# Patient Record
Sex: MALE | Race: WHITE | Employment: OTHER | URBAN - METROPOLITAN AREA
[De-identification: names, ages, dates, MRNs, and addresses within clinical notes are randomized per-mention and may not be internally consistent; named-entity substitution may affect disease eponyms.]

---

## 2022-11-01 ENCOUNTER — APPOINTMENT (OUTPATIENT)
Dept: CT IMAGING | Age: 82
End: 2022-11-01
Payer: MEDICARE

## 2022-11-01 ENCOUNTER — HOSPITAL ENCOUNTER (EMERGENCY)
Age: 82
Discharge: NURSING, REHAB OR LONG-TERM CARE | End: 2022-11-01
Attending: EMERGENCY MEDICINE
Payer: MEDICARE

## 2022-11-01 VITALS
RESPIRATION RATE: 16 BRPM | TEMPERATURE: 97 F | BODY MASS INDEX: 21 KG/M2 | SYSTOLIC BLOOD PRESSURE: 160 MMHG | WEIGHT: 150 LBS | DIASTOLIC BLOOD PRESSURE: 90 MMHG | HEART RATE: 62 BPM | OXYGEN SATURATION: 98 % | HEIGHT: 71 IN

## 2022-11-01 DIAGNOSIS — S01.81XA FACIAL LACERATION, INITIAL ENCOUNTER: ICD-10-CM

## 2022-11-01 DIAGNOSIS — S09.90XA INJURY OF HEAD, INITIAL ENCOUNTER: Primary | ICD-10-CM

## 2022-11-01 PROCEDURE — 12011 RPR F/E/E/N/L/M 2.5 CM/<: CPT

## 2022-11-01 PROCEDURE — 70450 CT HEAD/BRAIN W/O DYE: CPT

## 2022-11-01 PROCEDURE — 99284 EMERGENCY DEPT VISIT MOD MDM: CPT

## 2022-11-01 SDOH — ECONOMIC STABILITY: FOOD INSECURITY: WITHIN THE PAST 12 MONTHS, THE FOOD YOU BOUGHT JUST DIDN'T LAST AND YOU DIDN'T HAVE MONEY TO GET MORE.: NEVER TRUE

## 2022-11-01 ASSESSMENT — ENCOUNTER SYMPTOMS
SHORTNESS OF BREATH: 0
VOMITING: 0
NAUSEA: 0

## 2022-11-01 ASSESSMENT — PAIN DESCRIPTION - LOCATION: LOCATION: NOSE

## 2022-11-01 ASSESSMENT — PAIN - FUNCTIONAL ASSESSMENT: PAIN_FUNCTIONAL_ASSESSMENT: 0-10

## 2022-11-01 ASSESSMENT — PAIN SCALES - GENERAL: PAINLEVEL_OUTOF10: 2

## 2022-11-16 ENCOUNTER — APPOINTMENT (OUTPATIENT)
Dept: CT IMAGING | Age: 82
End: 2022-11-16
Payer: MEDICARE

## 2022-11-16 ENCOUNTER — APPOINTMENT (OUTPATIENT)
Dept: GENERAL RADIOLOGY | Age: 82
End: 2022-11-16
Payer: MEDICARE

## 2022-11-16 ENCOUNTER — HOSPITAL ENCOUNTER (EMERGENCY)
Age: 82
Discharge: HOME | End: 2022-11-16
Attending: EMERGENCY MEDICINE
Payer: MEDICARE

## 2022-11-16 VITALS
HEART RATE: 73 BPM | RESPIRATION RATE: 18 BRPM | TEMPERATURE: 98.8 F | DIASTOLIC BLOOD PRESSURE: 80 MMHG | OXYGEN SATURATION: 96 % | SYSTOLIC BLOOD PRESSURE: 165 MMHG

## 2022-11-16 DIAGNOSIS — F03.90 DEMENTIA, UNSPECIFIED DEMENTIA SEVERITY, UNSPECIFIED DEMENTIA TYPE, UNSPECIFIED WHETHER BEHAVIORAL, PSYCHOTIC, OR MOOD DISTURBANCE OR ANXIETY (HCC): ICD-10-CM

## 2022-11-16 DIAGNOSIS — S80.02XA CONTUSION OF LEFT KNEE, INITIAL ENCOUNTER: ICD-10-CM

## 2022-11-16 DIAGNOSIS — S09.90XA CLOSED HEAD INJURY, INITIAL ENCOUNTER: Primary | ICD-10-CM

## 2022-11-16 PROCEDURE — 72125 CT NECK SPINE W/O DYE: CPT

## 2022-11-16 PROCEDURE — 70450 CT HEAD/BRAIN W/O DYE: CPT

## 2022-11-16 PROCEDURE — 99284 EMERGENCY DEPT VISIT MOD MDM: CPT

## 2022-11-16 PROCEDURE — 12001 RPR S/N/AX/GEN/TRNK 2.5CM/<: CPT

## 2022-11-16 PROCEDURE — 73562 X-RAY EXAM OF KNEE 3: CPT

## 2022-11-16 ASSESSMENT — PAIN - FUNCTIONAL ASSESSMENT: PAIN_FUNCTIONAL_ASSESSMENT: NONE - DENIES PAIN

## 2022-11-16 NOTE — ED NOTES
Pt to ER per EMS, transferred to bed, full assist. Pt with dementia, from [facility], report from medics received, that patient rolled out of bed about two hours PTA, resulting in abrasion to left knee and approx 1 inch lac to forehead, bleeding controlled. Per nurses at the facility, many falls over the past 2 weeks. Pt denies pain.  Pt pleasantly confused, approp conversation, follows commands, respers even non labored, skin warm pink, no immediate distress, here for eval.      Judie Simmonsor, RN  11/16/22 9884

## 2022-11-16 NOTE — DISCHARGE INSTRUCTIONS
Take your medication as indicated and prescribed. For pain use acetaminophen (Tylenol). You can take over the counter acetaminophen tablets (1 - 2 tablets of the 500-mg strength every 6 hours). Do not take any medication that contains aspirin / ibuprofen or blood thinning properties until seen by your physician. PLEASE RETURN TO THE EMERGENCY DEPARTMENT IMMEDIATELY for worsening symptoms, persistent headache, change in vision / hearing / taste, ringing in your ears, sleeping more than normal, or if you develop any concerning symptoms such as: high fever not relieved by acetaminophen (Tylenol) and/or ibuprofen (Motrin / Advil), chills, shortness of breath, chest pain, feeling of your heart fluttering or racing, persistent nausea and/or vomiting, vomiting up blood, blood in your stool, loss of consciousness, numbness, weakness or tingling in the arms or legs or change in color of the extremities, changes in mental status, blurry vision, loss of bladder / bowel control, unable to follow up with your physician, or other any other care or concern.

## 2022-11-16 NOTE — ED NOTES
Pt in cont to urine, maritza care done, clean depends placed, linens changed.       Nafisa President, RN  11/16/22 5887

## 2022-11-16 NOTE — ED PROVIDER NOTES
[address] [facility] Emergency Department  [address] [address] [facility] [address] [facility] [address]  Phone: [phone]  Fax: [phone]      Pt Name: Sarah Renteria  Lists of hospitals in the [place]:[id number]  [place] 1940  Date of evaluation: 11/16/2022      CHIEF COMPLAINT       Chief Complaint   Patient presents with    Fall    Head Laceration       HISTORY OF PRESENT ILLNESS   Sarah Renteria is a 79 y.o. male who presents for evaluation of head laceration after a fall. The patient has history of dementia and is a poor historian. He lives in the memory unit at [facility]. He has had frequent falls over the past several weeks. He arrives by EMS. They report that the patient fell approximately 2 hours prior to arrival.  He was found next to his bed after he had rolled out of bed. The staff had already moved furniture away from his bed so he did not strike anything on the way down. The patient denies any pain. He has a 2 cm laceration to his forehead and bleeding is controlled. He does not take any anticoagulants. The fall was unwitnessed and they are unsure if he lost consciousness. The patient also has an abrasion over his right knee. He denies any pain to his head or his knee. The patient is a DNR CCA. His tetanus is reportedly up-to-date. The patient denies fever, chills, headache, vision changes, neck pain, back pain, chest pain, shortness of breath, abdominal pain, urinary/bowel symptoms, focal weakness, numbness, tingling, recent illness.     REVIEW OF SYSTEMS     Ten point review of systems was reviewed and is negative unless otherwise noted in the HPI    [address]    has a past medical history of Atherosclerotic heart disease, Benign prostatic hyperplasia, Cellulitis of left lower limb, Essential (primary) hypertension, Hyperlipidemia, Major depressive disorder, Periodic limb movement disorder, Restless leg syndrome, Thrombocytopenia ([facility] [address] and Unspecified dementia, mild, without behavioral disturbance, psychotic disturbance, mood disturbance, and anxiety. SURGICAL HISTORY      has no past surgical history on file. Craniotomy    CURRENT MEDICATIONS       There are no discharge medications for this patient. ALLERGIES     has No Known Allergies. FAMILY HISTORY     has no family status information on file. family history is not on file. SOCIAL HISTORY      reports that he has never smoked. He has never used smokeless tobacco. He reports that he does not drink alcohol and does not use drugs. PHYSICAL EXAM     INITIAL VITALS:  oral temperature is 98.8 °F (37.1 °C). His blood pressure is 165/80 (abnormal) and his pulse is 73. His respiration is 18 and oxygen saturation is 96%. CONSTITUTIONAL: no apparent distress, well appearing  SKIN: Bruises in multiple stages of healing. There is a acute 1 cm shallow laceration to the mid forehead without any active bleeding. No violation of the galea. No foreign body. Skin otherwise warm, dry, no jaundice, hives or petechiae  EYES: clear conjunctiva, non-icteric sclera, pupils 3 mm, equal, round reactive to light. Extraocular movements intact. HENT: normocephalic, moist mucus membranes  NECK: Nontender and supple with no nuchal rigidity, full range of motion  PULMONARY: clear to auscultation without wheezes, rhonchi, or rales, normal excursion, no accessory muscle use and no stridor  CARDIOVASCULAR: regular rate, rhythm. Strong radial pulses with intact distal perfusion. Capillary refill <2 seconds. GASTROINTESTINAL: soft, non-tender, non-distended, no palpable masses, no rebound or guarding   GENITOURINARY: No costovertebral angle tenderness to palpation  MUSCULOSKELETAL: No midline spinal tenderness, step off or deformity. Extremities are otherwise nontender to palpation and nonerythematous. Compartments soft. No peripheral edema. NEUROLOGIC: alert and oriented x self, GCS 15, baseline mentation and normal speech.  Moves all extremities x 4 without motor or sensory deficit. Cranial nerves II through XII intact. No cerebellar signs. No pronator drift. Normal finger-nose. PSYCHIATRIC: Pleasantly demented    DIAGNOSTIC RESULTS     EKG:  None    RADIOLOGY:   XR KNEE LEFT (3 VIEWS)    Result Date: 11/16/2022  EXAMINATION: THREE X-RAY VIEWS OF THE LEFT KNEE 11/16/2022 4:35 am COMPARISON: None. HISTORY: ORDERING SYSTEM PROVIDED HISTORY: fall out of bed, abrasion to left knee TECHNOLOGIST PROVIDED HISTORY: Fall out of bed, abrasion to left knee Reason for Exam: fall, left knee pain FINDINGS: Diffuse bone demineralization. Chronic appearing left knee arthroplasty and patellar resurfacing. Orthopedic hardware appears intact. No evidence for hardware complication is seen. No fractures or dislocations. No suspicious focal bony lesions. No bony erosions or bony destructive changes. No knee joint effusion. Mild anterior soft tissue swelling. No radiopaque foreign bodies or soft tissue gas. Atherosclerotic vascular calcifications. Mild anterior soft tissue swelling. No acute bony abnormality. Chronic left knee arthroplasty without evidence for hardware complication. Diffuse bone demineralization. CT Head W/O Contrast    Result Date: 11/16/2022  EXAMINATION: CT OF THE HEAD WITHOUT CONTRAST  11/16/2022 3:10 am TECHNIQUE: CT of the head was performed without the administration of intravenous contrast. Automated exposure control, iterative reconstruction, and/or weight based adjustment of the mA/kV was utilized to reduce the radiation dose to as low as reasonably achievable. COMPARISON: CT scan of brain on 11/01/2022.  HISTORY: ORDERING SYSTEM PROVIDED HISTORY: frequent falls, struck head on floor after rolling out of bed, h/o dementia, forehead laceration TECHNOLOGIST PROVIDED HISTORY: frequent falls, struck head on floor after rolling out of bed, h/o dementia, forehead laceration Decision Support Exception - unselect if not a suspected or confirmed emergency medical condition->Emergency Medical Condition (MA) Reason for Exam: fall, frontal head injury Relevant Medical/Surgical History: dementia FINDINGS: BRAIN/VENTRICLES: Evidence of previous bilateral craniotomies, right more extensive than left. Mild motion induced  artifacts on some of the images. No definable intracranial hemorrhage. Evidence of mild to moderate cortical atrophy changes. Evidence of moderate central atrophy changes with moderate to markedly prominent cerebral lateral ventricles, similar to previous studies. In the periventricular and central white matter, there are moderate chronic microvascular ischemic/aging changes. Evidence of old lacunar infarction in the left subinsular area. No focal abnormality in bilateral basal ganglia structures, bilateral thalami, brainstem and cerebellum. No evidence of intracranial mass, subdural or epidural hematoma. ORBITS: The visualized portion of the orbits demonstrate no acute abnormality. SINUSES: The visualized paranasal sinuses and mastoid air cells demonstrate no acute abnormality. No evidence of intracranial hemorrhage or any other definable acute intracranial abnormality. There are chronic changes with mild cerebral cortical atrophy and moderate central atrophy and moderate chronic microvascular ischemic/aging changes in the white matter of cerebrum. Evidence of previous bilateral craniotomies redemonstrated. CT CSpine W/O Contrast    Result Date: 11/16/2022  EXAMINATION: CT OF THE CERVICAL SPINE WITHOUT CONTRAST 11/16/2022 3:11 am TECHNIQUE: CT of the cervical spine was performed without the administration of intravenous contrast. Multiplanar reformatted images are provided for review. Automated exposure control, iterative reconstruction, and/or weight based adjustment of the mA/kV was utilized to reduce the radiation dose to as low as reasonably achievable. COMPARISON: None.  HISTORY: ORDERING SYSTEM PROVIDED HISTORY: fall out of bed, struck head, dementia TECHNOLOGIST PROVIDED HISTORY: Fall out of bed, struck head, dementia Decision Support Exception - unselect if not a suspected or confirmed emergency medical condition->Emergency Medical Condition (MA) Reason for Exam: fall out of bed, struck head, dementia FINDINGS: BONES/ALIGNMENT: There is no definable acute fracture or dislocation in the cervical spine. Evidence of previous anterior spinal body fusion from C3 level through C5 level. Evidence of mild-to-moderate loss of heights of C6 and C7 vertebral bodies, likely to be chronic due to osteoporosis. There is no definable fracture line in these vertebral bodies. The odontoid process, the atlantodental joint and the craniovertebral junction are intact. Cervical facets are normally aligned bilaterally without identifiable fracture. At C1 level, there is no evidence of fracture. At C2-C3 level, minimal degenerative disc disease and moderate left facet osteoarthritis. No significant central stenosis. Moderate stenosis of left neural foramen. No stenosis of the right neural foramen. Regions of anterior spinal body fusion at C3-C4 and C4-C5 levels. Moderate to marked facet osteoarthritis bilaterally. No significant central stenosis but evidence of severe neural foraminal stenosis at these levels, bilaterally. At C5-C6 level, mild to moderate degenerative disc disease and marked hypertrophic facet osteoarthritis bilaterally. No significant central stenosis. Severe neural foraminal stenosis bilaterally. At C6-C7 level, minimal degenerative disc disease and mild-to-moderate facet osteoarthritis, without central stenosis but with moderate neural foraminal stenosis bilaterally. At C7-T1 level, mild DDD changes with moderate facet osteoarthritis bilaterally. At T1-T2 level, minimal DDD changes without stenosis. SOFT TISSUES: There is no prevertebral soft tissue swelling. No definable acute process in the apices of the lungs.      No definable acute fracture or dislocation in the cervical spine. Mild-to-moderate loss of heights of C6 and C7 vertebral bodies without definable fracture line, most likely due to old compression secondary to process of osteopenia/osteoporosis. Evidence of previous anterior spinal fusion from C3 level through C5 level without central stenosis but with severe neural foraminal stenosis bilaterally at C3-C4 and C4-C5 levels. In the rest of cervical spine there are also findings of moderate to severe facet osteoarthritis bilaterally with multilevel moderate to severe neural foraminal stenosis. LABS:  No results found for this visit on 11/16/22. EMERGENCY DEPARTMENT COURSE:        The patient was given the following medications:  No orders of the defined types were placed in this encounter.        Vitals:    Vitals:    11/16/22 0337 11/16/22 0352 11/16/22 0437 11/16/22 0452   BP:       Pulse:       Resp:       Temp:       TempSrc:       SpO2: 99% 99% 96% 96%     -------------------------  BP: (!) 165/80, Temp: 98.8 °F (37.1 °C), Heart Rate: 73, Resp: 18    CONSULTS:  None    CRITICAL CARE:   None    PROCEDURES:  Lac Repair    Date/Time: 11/16/2022 6:36 AM  Performed by: Nazario Childress DO  Authorized by: Nazario Childress DO     Consent:     Consent obtained:  Verbal    Consent given by:  Patient    Risks, benefits, and alternatives were discussed: yes      Risks discussed:  Infection, pain, tendon damage, poor cosmetic result, need for additional repair, nerve damage, poor wound healing, vascular damage and retained foreign body    Alternatives discussed:  No treatment, delayed treatment, observation and referral  Universal protocol:     Procedure explained and questions answered to patient or proxy's satisfaction: yes      Imaging studies available: yes      Immediately prior to procedure, a time out was called: yes      Patient identity confirmed:  Arm band  Anesthesia:     Anesthesia method:  None  Laceration details: Location:  Scalp    Scalp location:  Frontal    Length (cm):  2    Depth (mm):  1  Pre-procedure details:     Preparation:  Patient was prepped and draped in usual sterile fashion and imaging obtained to evaluate for foreign bodies  Exploration:     Limited defect created (wound extended): no      Hemostasis achieved with:  Direct pressure    Imaging outcome: foreign body not noted      Wound exploration: wound explored through full range of motion and entire depth of wound visualized      Wound extent: no fascia violation noted, no foreign bodies/material noted, no muscle damage noted, no nerve damage noted, no tendon damage noted, no underlying fracture noted and no vascular damage noted      Contaminated: no    Treatment:     Area cleansed with:  Chlorhexidine    Amount of cleaning:  Standard    Irrigation solution:  Sterile saline    Irrigation volume:  500cc    Irrigation method:  Pressure wash    Visualized foreign bodies/material removed: no      Debridement:  None    Undermining:  None    Scar revision: no    Skin repair:     Repair method:  Tissue adhesive  Approximation:     Approximation:  Close  Repair type:     Repair type:  Simple  Post-procedure details:     Dressing:  Open (no dressing)    Procedure completion:  Tolerated well, no immediate complications        DIAGNOSIS/ MDM:   Mirian Adorno is a 79 y.o. male who presents with a fall. Vital signs are stable. He did strike his head and has a small laceration that was closed with Dermabond. No signs of basilar skull fracture. CT head, CT cervical spine, and x-ray of the left knee show no acute process. I have low suspicion for fracture, dislocation, intracranial hemorrhage, or compartment syndrome. The patient denies any pain. He will be discharged back to his memory care unit. I recommended that he take Tylenol as needed for pain.   He was instructed to follow-up with his PCP within 1 to 2 days and to return to the ER for worsening symptoms or any other concern. The patient understands that at this time there is no evidence for a more malignant underlying process, but also understands that early in the process of an illness or injury, an emergency department work-up can be falsely reassuring. Routine discharge counseling was given, and the patient understands that worsening, changing or persistent symptoms should prompt a immediate call or follow-up with their primary care physician or return to the emergency department. The importance of appropriate follow-up was also discussed. I have reviewed the disposition diagnosis with the patient. I have answered their questions and given discharge instructions. They voiced understanding of these instructions and did not have any further questions or complaints. FINAL IMPRESSION      1. Closed head injury, initial encounter    2. Contusion of left knee, initial encounter    3. Dementia, unspecified dementia severity, unspecified dementia type, unspecified whether behavioral, psychotic, or mood disturbance or anxiety [facility])          DISPOSITION/PLAN   DISPOSITION Decision To Discharge 11/16/2022 06:55:23 AM        PATIENT REFERRED TO:  Antony Grimm MD  [address]  [phone]    Schedule an appointment as soon as possible for a visit in 2 days      [address] [facility] Emergency Department  [address] [address]  [phone]  Go to   If symptoms worsen    DISCHARGE MEDICATIONS:  There are no discharge medications for this patient.       (Please note that portions of this note were completed with a voice recognitionprogram.  Efforts were made to edit the dictations but occasionally words are mis-transcribed.)    Flores Vasquez DO, [facility]  Emergency Physician Attending          Flores Vasquez DO  11/16/22 5981

## 2022-11-20 ENCOUNTER — APPOINTMENT (OUTPATIENT)
Dept: GENERAL RADIOLOGY | Age: 82
End: 2022-11-20
Payer: MEDICARE

## 2022-11-20 ENCOUNTER — HOSPITAL ENCOUNTER (EMERGENCY)
Age: 82
Discharge: HOME | End: 2022-11-20
Attending: EMERGENCY MEDICINE
Payer: MEDICARE

## 2022-11-20 VITALS
DIASTOLIC BLOOD PRESSURE: 77 MMHG | HEIGHT: 71 IN | SYSTOLIC BLOOD PRESSURE: 138 MMHG | WEIGHT: 150 LBS | TEMPERATURE: 97.7 F | RESPIRATION RATE: 16 BRPM | BODY MASS INDEX: 21 KG/M2 | OXYGEN SATURATION: 94 % | HEART RATE: 80 BPM

## 2022-11-20 DIAGNOSIS — S22.42XA CLOSED FRACTURE OF MULTIPLE RIBS OF LEFT SIDE, INITIAL ENCOUNTER: Primary | ICD-10-CM

## 2022-11-20 PROCEDURE — 99283 EMERGENCY DEPT VISIT LOW MDM: CPT

## 2022-11-20 PROCEDURE — 71111 X-RAY EXAM RIBS/CHEST4/> VWS: CPT

## 2022-11-20 RX ORDER — HYDROCODONE BITARTRATE AND ACETAMINOPHEN 5; 325 MG/1; MG/1
1 TABLET ORAL EVERY 6 HOURS PRN
Qty: 12 TABLET | Refills: 0 | Status: SHIPPED | OUTPATIENT
Start: 2022-11-20 | End: 2022-11-23

## 2022-11-20 ASSESSMENT — PAIN - FUNCTIONAL ASSESSMENT: PAIN_FUNCTIONAL_ASSESSMENT: 0-10

## 2022-11-20 ASSESSMENT — PAIN SCALES - GENERAL: PAINLEVEL_OUTOF10: 0

## 2022-11-20 NOTE — ED PROVIDER NOTES
[facility] Emergency Department  [id number] [address] [facility] [address] [facility] [address]  Phone: [phone]  Fax: [phone]      Pt Name: Chanda MOTA:[id number]  [place] 1940  Date of evaluation: 11/20/2022      CHIEF COMPLAINT       Chief Complaint   Patient presents with    Chest Pain     Non-Cardiac  Eunice Sanders on November 17th  No LOC   Not on Blood thinners. HISTORY OF PRESENT ILLNESS   59-year-old male presents to the emergency department today complaining of chest pain. On this past Thursday he was in his wheelchair and something caught his attention and he fell backwards directly in the wheelchair fit. He felt a \"pop\" in his chest and is concerned that he may have broken ribs as he continues to have bilateral rib pain. Denies any shortness of breath. Certain positions make the pain feel better. Other positions make the pain feel worse. Pain does not radiate anywhere. He denies strike his head and denies any loss of consciousness. REVIEWOF SYSTEMS     Review of Systems   All other systems reviewed and are negative. PAST MEDICAL HISTORY    has a past medical history of Atherosclerotic heart disease, Benign prostatic hyperplasia, Cellulitis of left lower limb, Essential (primary) hypertension, Hyperlipidemia, Major depressive disorder, Periodic limb movement disorder, Restless leg syndrome, Thrombocytopenia ([address] and Unspecified dementia, mild, without behavioral disturbance, psychotic disturbance, mood disturbance, and anxiety. SURGICAL HISTORY      has no past surgical history on file. CURRENTMEDICATIONS       Previous Medications    No medications on file       ALLERGIES     has No Known Allergies. FAMILY HISTORY     has no family status information on file. family history is not on file. SOCIAL HISTORY      reports that he has never smoked.  He has never used smokeless tobacco. He reports that he does not drink alcohol and does not use drugs.    PHYSICAL EXAM     INITIAL VITALS:  height is 5' 11\" (1.803 m) and weight is 68 kg (150 lb). His oral temperature is 97.7 °F (36.5 °C). His blood pressure is 138/77 and his pulse is 80. His respiration is 16 and oxygen saturation is 94%. Physical Exam  Vitals reviewed. Constitutional:       Appearance: He is well-developed. HENT:      Head: Normocephalic and atraumatic. Eyes:      Conjunctiva/sclera: Conjunctivae normal.      Pupils: Pupils are equal, round, and reactive to light. Neck:      Trachea: No tracheal deviation. Cardiovascular:      Rate and Rhythm: Normal rate and regular rhythm. Pulmonary:      Effort: Pulmonary effort is normal.      Breath sounds: Normal breath sounds. Abdominal:      General: Bowel sounds are normal. There is no distension. Palpations: Abdomen is soft. Tenderness: There is no abdominal tenderness. Musculoskeletal:         General: No tenderness. Normal range of motion. Cervical back: Normal range of motion and neck supple. Skin:     General: Skin is warm and dry. Findings: No rash. Neurological:      Mental Status: He is alert and oriented to person, place, and time. Psychiatric:         Behavior: Behavior normal.         Thought Content: Thought content normal.         Judgment: Judgment normal.         MDM:   Chest pain is not reproducible by my evaluation. Rib series has been ordered. XR RIBS BILATERAL (MIN 4 VIEWS)    Result Date: 11/20/2022  EXAMINATION: 4 XRAY VIEWS OF THE BILATERAL RIBS WITH FRONTAL XRAY VIEW OF THE CHEST 11/20/2022 3:07 pm COMPARISON: None. HISTORY: ORDERING SYSTEM PROVIDED HISTORY: Traumatic bilateral rib pain TECHNOLOGIST PROVIDED HISTORY: Traumatic bilateral rib pain Reason for Exam: Pt fell 3 days ago off a chair. Pain to both sides of lower anterior ribs. FINDINGS: Heart size within normal limits. Aorta is tortuous mild atherosclerotic changes.   Elevation of the right hemidiaphragm without change. Interposition of colon in front of the liver. No consolidation. Fracture of the right 4th and 5th ribs. These may be old. Negative for subcutaneous emphysema. Mild buckling of the 8th and 9th ribs. The left ribs are unremarkable. Fracture of the lateral 7th and 8th ribs. No subcutaneous emphysema. Fractures of the 7th and 8th ribs on the left. Probable old rib fractures on the right. XR KNEE LEFT (3 VIEWS)    Result Date: 11/16/2022  EXAMINATION: THREE X-RAY VIEWS OF THE LEFT KNEE 11/16/2022 4:35 am COMPARISON: None. HISTORY: ORDERING SYSTEM PROVIDED HISTORY: fall out of bed, abrasion to left knee TECHNOLOGIST PROVIDED HISTORY: Fall out of bed, abrasion to left knee Reason for Exam: fall, left knee pain FINDINGS: Diffuse bone demineralization. Chronic appearing left knee arthroplasty and patellar resurfacing. Orthopedic hardware appears intact. No evidence for hardware complication is seen. No fractures or dislocations. No suspicious focal bony lesions. No bony erosions or bony destructive changes. No knee joint effusion. Mild anterior soft tissue swelling. No radiopaque foreign bodies or soft tissue gas. Atherosclerotic vascular calcifications. Mild anterior soft tissue swelling. No acute bony abnormality. Chronic left knee arthroplasty without evidence for hardware complication. Diffuse bone demineralization. CT Head W/O Contrast    Result Date: 11/16/2022  EXAMINATION: CT OF THE HEAD WITHOUT CONTRAST  11/16/2022 3:10 am TECHNIQUE: CT of the head was performed without the administration of intravenous contrast. Automated exposure control, iterative reconstruction, and/or weight based adjustment of the mA/kV was utilized to reduce the radiation dose to as low as reasonably achievable. COMPARISON: CT scan of brain on 11/01/2022.  HISTORY: ORDERING SYSTEM PROVIDED HISTORY: frequent falls, struck head on floor after rolling out of bed, h/o dementia, forehead laceration TECHNOLOGIST PROVIDED HISTORY: frequent falls, struck head on floor after rolling out of bed, h/o dementia, forehead laceration Decision Support Exception - unselect if not a suspected or confirmed emergency medical condition->Emergency Medical Condition (MA) Reason for Exam: fall, frontal head injury Relevant Medical/Surgical History: dementia FINDINGS: BRAIN/VENTRICLES: Evidence of previous bilateral craniotomies, right more extensive than left. Mild motion induced  artifacts on some of the images. No definable intracranial hemorrhage. Evidence of mild to moderate cortical atrophy changes. Evidence of moderate central atrophy changes with moderate to markedly prominent cerebral lateral ventricles, similar to previous studies. In the periventricular and central white matter, there are moderate chronic microvascular ischemic/aging changes. Evidence of old lacunar infarction in the left subinsular area. No focal abnormality in bilateral basal ganglia structures, bilateral thalami, brainstem and cerebellum. No evidence of intracranial mass, subdural or epidural hematoma. ORBITS: The visualized portion of the orbits demonstrate no acute abnormality. SINUSES: The visualized paranasal sinuses and mastoid air cells demonstrate no acute abnormality. No evidence of intracranial hemorrhage or any other definable acute intracranial abnormality. There are chronic changes with mild cerebral cortical atrophy and moderate central atrophy and moderate chronic microvascular ischemic/aging changes in the white matter of cerebrum. Evidence of previous bilateral craniotomies redemonstrated.      CT HEAD WO CONTRAST    Result Date: 11/1/2022  EXAMINATION: CT OF THE HEAD WITHOUT CONTRAST  11/1/2022 12:23 am TECHNIQUE: CT of the head was performed without the administration of intravenous contrast. Automated exposure control, iterative reconstruction, and/or weight based adjustment of the mA/kV was utilized to reduce the radiation dose to as low as reasonably achievable. COMPARISON: None. HISTORY: ORDERING SYSTEM PROVIDED HISTORY: head injury TECHNOLOGIST PROVIDED HISTORY: head injury Decision Support Exception - unselect if not a suspected or confirmed emergency medical condition->Emergency Medical Condition (MA) Reason for Exam: fall, head injury FINDINGS: BRAIN/VENTRICLES: There is no acute intracranial hemorrhage, mass effect or midline shift. No abnormal extra-axial fluid collection. The gray-white differentiation is maintained without evidence of an acute infarct. There is no evidence of hydrocephalus. Prominent ventricles and sulci, consistent with moderate parenchymal volume loss. Postoperative dural thickening. ORBITS: The visualized portion of the orbits demonstrate no acute abnormality. SINUSES: The visualized paranasal sinuses and mastoid air cells demonstrate no acute abnormality. SOFT TISSUES/SKULL: Soft tissue hematoma right forehead. Right-sided craniectomy. Low bone density right temporal bone, likely postoperative change. No acute abnormality of the visualized skull. No acute intracranial abnormality. Postsurgical changes right-sided craniectomy. Soft tissue hematoma right forehead. Prominent ventricles and sulci, consistent with moderate parenchymal volume loss. CT CSpine W/O Contrast    Result Date: 11/16/2022  EXAMINATION: CT OF THE CERVICAL SPINE WITHOUT CONTRAST 11/16/2022 3:11 am TECHNIQUE: CT of the cervical spine was performed without the administration of intravenous contrast. Multiplanar reformatted images are provided for review. Automated exposure control, iterative reconstruction, and/or weight based adjustment of the mA/kV was utilized to reduce the radiation dose to as low as reasonably achievable. COMPARISON: None.  HISTORY: ORDERING SYSTEM PROVIDED HISTORY: fall out of bed, struck head, dementia TECHNOLOGIST PROVIDED HISTORY: Fall out of bed, struck head, dementia Decision Support Exception - unselect if not a suspected or confirmed emergency medical condition->Emergency Medical Condition (MA) Reason for Exam: fall out of bed, struck head, dementia FINDINGS: BONES/ALIGNMENT: There is no definable acute fracture or dislocation in the cervical spine. Evidence of previous anterior spinal body fusion from C3 level through C5 level. Evidence of mild-to-moderate loss of heights of C6 and C7 vertebral bodies, likely to be chronic due to osteoporosis. There is no definable fracture line in these vertebral bodies. The odontoid process, the atlantodental joint and the craniovertebral junction are intact. Cervical facets are normally aligned bilaterally without identifiable fracture. At C1 level, there is no evidence of fracture. At C2-C3 level, minimal degenerative disc disease and moderate left facet osteoarthritis. No significant central stenosis. Moderate stenosis of left neural foramen. No stenosis of the right neural foramen. Regions of anterior spinal body fusion at C3-C4 and C4-C5 levels. Moderate to marked facet osteoarthritis bilaterally. No significant central stenosis but evidence of severe neural foraminal stenosis at these levels, bilaterally. At C5-C6 level, mild to moderate degenerative disc disease and marked hypertrophic facet osteoarthritis bilaterally. No significant central stenosis. Severe neural foraminal stenosis bilaterally. At C6-C7 level, minimal degenerative disc disease and mild-to-moderate facet osteoarthritis, without central stenosis but with moderate neural foraminal stenosis bilaterally. At C7-T1 level, mild DDD changes with moderate facet osteoarthritis bilaterally. At T1-T2 level, minimal DDD changes without stenosis. SOFT TISSUES: There is no prevertebral soft tissue swelling. No definable acute process in the apices of the lungs. No definable acute fracture or dislocation in the cervical spine.  Mild-to-moderate loss of heights of C6 and C7 vertebral bodies without definable fracture line, most likely due to old compression secondary to process of osteopenia/osteoporosis. Evidence of previous anterior spinal fusion from C3 level through C5 level without central stenosis but with severe neural foraminal stenosis bilaterally at C3-C4 and C4-C5 levels. In the rest of cervical spine there are also findings of moderate to severe facet osteoarthritis bilaterally with multilevel moderate to severe neural foraminal stenosis. I had the respiratory therapist teach and dispense an incentive spirometer. I will treat him symptomatically. Narcotic warning has been given he will be discharged home. The patient was given the following medications:  Orders Placed This Encounter   Medications    HYDROcodone-acetaminophen (NORCO) 5-325 MG per tablet     Sig: Take 1 tablet by mouth every 6 hours as needed for Pain for up to 3 days. Dispense:  12 tablet     Refill:  0          FINAL IMPRESSION      1. Closed fracture of multiple ribs of left side, initial encounter          DISPOSITION/PLAN   DISPOSITION Decision To Discharge 11/20/2022 03:38:25 PM      Condition on Disposition  Good    PATIENT REFERRED TO:  Loyda Marshall MD  [address] [address]  [phone]    Schedule an appointment as soon as possible for a visit in 3 days      DISCHARGE MEDICATIONS:  New Prescriptions    HYDROCODONE-ACETAMINOPHEN (NORCO) 5-325 MG PER TABLET    Take 1 tablet by mouth every 6 hours as needed for Pain for up to 3 days.        (Please note that portions of this note werecompleted with a voice recognition program.  Efforts were made to edit the dictations but occasionally words are mis-transcribed.)    Roma Aquino MD MD, F.A.C.E.P, F.A.A.E.M  Emergency Physician Attending         Roma Aquino MD  11/20/22 [id number]

## 2023-05-16 RX ORDER — ACETAMINOPHEN 325 MG/1
650 TABLET ORAL EVERY 6 HOURS PRN
COMMUNITY

## 2023-05-16 RX ORDER — ATORVASTATIN CALCIUM 80 MG/1
TABLET, FILM COATED ORAL DAILY
COMMUNITY

## 2023-05-16 RX ORDER — POTASSIUM CHLORIDE 750 MG/1
CAPSULE, EXTENDED RELEASE ORAL 2 TIMES DAILY
COMMUNITY

## 2023-05-16 RX ORDER — LISINOPRIL 20 MG/1
TABLET ORAL DAILY
COMMUNITY

## 2023-05-16 RX ORDER — MELATONIN 10 MG
0.5 CAPSULE ORAL NIGHTLY
COMMUNITY

## 2023-05-16 RX ORDER — HYDROCODONE BITARTRATE AND ACETAMINOPHEN 5; 325 MG/1; MG/1
1 TABLET ORAL EVERY 6 HOURS PRN
COMMUNITY
Start: 2023-03-15

## 2023-05-16 RX ORDER — RIVASTIGMINE 9.5 MG/24H
1 PATCH, EXTENDED RELEASE TRANSDERMAL DAILY
COMMUNITY

## 2023-05-16 RX ORDER — FAMOTIDINE 20 MG/1
TABLET, FILM COATED ORAL 2 TIMES DAILY
COMMUNITY

## 2023-05-16 RX ORDER — FUROSEMIDE 20 MG/1
TABLET ORAL DAILY
COMMUNITY

## 2023-05-16 RX ORDER — VENLAFAXINE 75 MG/1
TABLET ORAL 2 TIMES DAILY
COMMUNITY

## 2023-05-16 RX ORDER — ROPINIROLE 3 MG/1
TABLET, FILM COATED ORAL NIGHTLY
COMMUNITY

## 2023-05-16 RX ORDER — TAMSULOSIN HYDROCHLORIDE 0.4 MG/1
CAPSULE ORAL DAILY
COMMUNITY

## 2023-05-16 RX ORDER — CARBIDOPA AND LEVODOPA 25; 100 MG/1; MG/1
2 TABLET, ORALLY DISINTEGRATING ORAL 3 TIMES DAILY
COMMUNITY

## 2023-05-16 NOTE — PROGRESS NOTES
Verified with [facility] per phone. Pt surgery -  ETA arrival time and  we requested they send MAR with last doses documented. [facility] will send packet of info with the pt. Pt wife will be here day of surgery to sign for pt legally  Nurse reports pt is in a memory care unit and is alert- but  basically nonambulatory .

## 2023-05-17 ENCOUNTER — HOSPITAL ENCOUNTER (OUTPATIENT)
Age: 83
Setting detail: OUTPATIENT SURGERY
Discharge: HOME | End: 2023-05-17
Attending: UROLOGY | Admitting: UROLOGY
Payer: MEDICARE

## 2023-05-17 VITALS
TEMPERATURE: 96.5 F | HEIGHT: 71 IN | SYSTOLIC BLOOD PRESSURE: 156 MMHG | WEIGHT: 187.1 LBS | OXYGEN SATURATION: 99 % | BODY MASS INDEX: 26.19 KG/M2 | RESPIRATION RATE: 17 BRPM | HEART RATE: 66 BPM | DIASTOLIC BLOOD PRESSURE: 89 MMHG

## 2023-05-17 PROCEDURE — 6370000000 HC RX 637 (ALT 250 FOR IP): Performed by: UROLOGY

## 2023-05-17 PROCEDURE — 2709999900 HC NON-CHARGEABLE SUPPLY: Performed by: UROLOGY

## 2023-05-17 PROCEDURE — 7100000010 HC PHASE II RECOVERY - FIRST 15 MIN: Performed by: UROLOGY

## 2023-05-17 PROCEDURE — 7100000011 HC PHASE II RECOVERY - ADDTL 15 MIN: Performed by: UROLOGY

## 2023-05-17 PROCEDURE — 3600000002 HC SURGERY LEVEL 2 BASE: Performed by: UROLOGY

## 2023-05-17 RX ORDER — LIDOCAINE HYDROCHLORIDE 20 MG/ML
JELLY TOPICAL PRN
Status: DISCONTINUED | OUTPATIENT
Start: 2023-05-17 | End: 2023-05-17 | Stop reason: ALTCHOICE

## 2023-05-17 RX ORDER — OXYBUTYNIN CHLORIDE 5 MG/1
5 TABLET, EXTENDED RELEASE ORAL DAILY
Qty: 30 TABLET | Refills: 12 | Status: SHIPPED | OUTPATIENT
Start: 2023-05-17

## 2023-05-17 RX ORDER — CIPROFLOXACIN 250 MG/1
500 TABLET, FILM COATED ORAL ONCE
Status: COMPLETED | OUTPATIENT
Start: 2023-05-17 | End: 2023-05-17

## 2023-05-17 RX ORDER — CHOLECALCIFEROL (VITAMIN D3) 1250 MCG
CAPSULE ORAL
COMMUNITY

## 2023-05-17 RX ADMIN — CIPROFLOXACIN 500 MG: 250 TABLET, FILM COATED ORAL at 17:11

## 2023-05-17 ASSESSMENT — PAIN - FUNCTIONAL ASSESSMENT: PAIN_FUNCTIONAL_ASSESSMENT: 0-10

## 2023-05-17 ASSESSMENT — PAIN DESCRIPTION - DESCRIPTORS: DESCRIPTORS: SORE

## 2023-05-17 NOTE — H&P
[facility]  Urology H&P Note     Patient:  Maya Bauer  MRN: [id number]  YOB: 1940    ATTENDING: Beltran Bravo MD     CHIEF COMPLAINT:  BPH    HISTORY OF PRESENT ILLNESS:   The patient is a 79 y.o. male who presents with bph    Patient's old records, notes and chart reviewed and summarized above. Past Medical History:    Past Medical History:   Diagnosis Date    Anxiety     Arthritis     psoriatic    Atherosclerotic heart disease     stent    Benign prostatic hyperplasia     frequency and urgency    Brain bleed (HCC)     Cellulitis of left lower limb     Essential (primary) hypertension     Hyperlipidemia     Major depressive disorder     Memory loss     LIVE (obstructive sleep apnea)     Periodic limb movement disorder     Restless leg syndrome     Thrombocytopenia (HCC)     Unspecified dementia, mild, without behavioral disturbance, psychotic disturbance, mood disturbance, and anxiety (HCC)        Past Surgical History:    Past Surgical History:   Procedure Laterality Date    BACK SURGERY      lumbar ,laminectomy    BLADDER SURGERY      botox and button    BUNIONECTOMY      CARPAL TUNNEL RELEASE      COSMETIC SURGERY      blepharoplasties    CRANIOTOMY      x3 ( piece of metal removed from head)    EYE SURGERY      strabismus- corrective    FINGER SURGERY      pulley release    FRACTURE SURGERY      Rt Arm    JOINT REPLACEMENT Bilateral     NECK SURGERY      OTHER SURGICAL HISTORY      uvulplasty    PROSTATE SURGERY      TURP- Urolift    TOE SURGERY      VASECTOMY      WRIST SURGERY         Medications Prior to Admission:   Prior to Admission medications    Medication Sig Start Date End Date Taking? Authorizing Provider   HYDROcodone-acetaminophen (NORCO) 5-325 MG per tablet Take 1 tablet by mouth every 6 hours as needed.  Max Daily Amount: 4 tablets 3/15/23  Yes Historical Provider, MD   acetaminophen (TYLENOL) 325 MG tablet Take 2 tablets

## 2023-05-17 NOTE — DISCHARGE INSTRUCTIONS
Discharge instructions: Cystoscopy  You May experience painful urination and see blood in the urine after your procedure. This should resolve over time. Pt ok to discharge home in good condition  No heavy lifting, >10 lbs for today  Pt should avoid strenuous activity for today  Pt should walk moderately at home  Pt ok to shower   Pt may resume diet as tolerated  Please call attending physician or hospital  with questions  Call or Present to ED if fever (> 101F), intractable nausea vomiting or pain. Rx in chart     Pt should follow up with Dr. Elif Herzog MD , in 2-4 weeks, call to confirm appointment  - will start oxybutynin 5 mg PO daily for urge incontinence    Please give cipro 500 mg PO once prior to discharge.

## 2023-05-17 NOTE — OP NOTE
Cystoscopy Operative Note  Surgeon: Domenica Lackey MD   Anesthesia: Urethral 2%  Indications:  BPH & urinary incontinence. -  history of previous button procedure and Urolift in [place], now has urge incontinence. Position: supine  EBL: 1 cc  Specimen: none  Findings:   The patient was prepped and draped in the usual sterile fashion. The flexible cystoscope was advanced through the urethra and into the bladder. The bladder was thoroughly inspected and the following was noted:    Residual Urine: Moderate   Urethra: No abnormalities of the urethra are noted. Prostate: Previous Urolift, with repeat obstruction. Small calcification on a clip which was pushed back into the bladder.  -  Medium gland (<80 gm) Complete obstruction by lateral lobe of prostate. Bladder: No tumors or CIS noted. No bladder diverticulum. Moderate  trabeculation noted. Ureters: Clear efflux from both ureters. Orifices with normal configuration and location. The cystoscope was removed. The patient tolerated the procedure well.    Plan: Good candidate for Repeat urolift

## 2023-06-08 ENCOUNTER — TRANSCRIBE ORDERS (OUTPATIENT)
Dept: ADMINISTRATIVE | Age: 83
End: 2023-06-08

## 2023-06-09 ENCOUNTER — TRANSCRIBE ORDERS (OUTPATIENT)
Dept: ADMINISTRATIVE | Age: 83
End: 2023-06-09

## 2023-06-09 DIAGNOSIS — R35.0 URINARY FREQUENCY: Primary | ICD-10-CM

## 2023-06-21 ENCOUNTER — HOSPITAL ENCOUNTER (OUTPATIENT)
Dept: ULTRASOUND IMAGING | Age: 83
Discharge: HOME | End: 2023-06-23
Payer: MEDICARE

## 2023-06-21 DIAGNOSIS — R35.0 URINARY FREQUENCY: ICD-10-CM

## 2023-06-21 PROCEDURE — 76770 US EXAM ABDO BACK WALL COMP: CPT

## 2023-08-04 ENCOUNTER — HOSPITAL ENCOUNTER (OUTPATIENT)
Dept: MRI IMAGING | Age: 83
End: 2023-08-04
Attending: NEUROLOGICAL SURGERY
Payer: MEDICARE

## 2023-08-04 DIAGNOSIS — S22.080A WEDGE COMPRESSION FRACTURE OF T11-T12 VERTEBRA, INITIAL ENCOUNTER FOR CLOSED FRACTURE (HCC): ICD-10-CM

## 2023-08-04 PROCEDURE — 72146 MRI CHEST SPINE W/O DYE: CPT

## 2023-11-20 ENCOUNTER — TELEPHONE (OUTPATIENT)
Age: 83
End: 2023-11-20

## 2023-11-20 DIAGNOSIS — S22.080S WEDGE COMPRESSION FRACTURE OF T11-T12 VERTEBRA, SEQUELA: Primary | ICD-10-CM

## 2023-12-13 ENCOUNTER — HOSPITAL ENCOUNTER (OUTPATIENT)
Age: 83
Discharge: HOME | End: 2023-12-15
Payer: MEDICARE

## 2023-12-13 ENCOUNTER — HOSPITAL ENCOUNTER (OUTPATIENT)
Dept: GENERAL RADIOLOGY | Age: 83
Discharge: HOME | End: 2023-12-15
Payer: MEDICARE

## 2023-12-13 DIAGNOSIS — S22.080S WEDGE COMPRESSION FRACTURE OF T11-T12 VERTEBRA, SEQUELA: ICD-10-CM

## 2023-12-13 PROCEDURE — 72070 X-RAY EXAM THORAC SPINE 2VWS: CPT

## 2023-12-13 NOTE — TELEPHONE ENCOUNTER
12/13/23 ckp: LISY from [facility] at [facility] radiology - needs order for xray for pt. The patient and his wife are there for this to be done today. I put in xray orders for thoracic spine xrays. Tried to call pete back several times [phone] - no answer. Called main number. Pt's daughter called back - they now have the order and will get it done today.

## 2024-12-29 ENCOUNTER — HOSPITAL ENCOUNTER (OUTPATIENT)
Age: 84
Setting detail: SPECIMEN
Discharge: HOME | End: 2024-12-29

## 2024-12-29 LAB
BILIRUB UR QL STRIP: NEGATIVE
CLARITY UR: CLEAR
COLOR UR: YELLOW
GLUCOSE UR STRIP-MCNC: NEGATIVE MG/DL
HGB UR QL STRIP.AUTO: NEGATIVE
KETONES UR STRIP-MCNC: NEGATIVE MG/DL
LEUKOCYTE ESTERASE UR QL STRIP: NEGATIVE
NITRITE UR QL STRIP: NEGATIVE
PH UR STRIP: 5.5 [PH] (ref 5–8)
PROT UR STRIP-MCNC: NEGATIVE MG/DL
SP GR UR STRIP: 1.02 (ref 1–1.03)
UROBILINOGEN UR STRIP-ACNC: NORMAL EU/DL (ref 0–1)

## 2024-12-29 PROCEDURE — 81003 URINALYSIS AUTO W/O SCOPE: CPT

## 2025-02-08 ENCOUNTER — HOSPITAL ENCOUNTER (EMERGENCY)
Age: 85
Discharge: HOME | End: 2025-02-08
Attending: STUDENT IN AN ORGANIZED HEALTH CARE EDUCATION/TRAINING PROGRAM
Payer: MEDICARE

## 2025-02-08 ENCOUNTER — APPOINTMENT (OUTPATIENT)
Dept: GENERAL RADIOLOGY | Age: 85
End: 2025-02-08
Payer: MEDICARE

## 2025-02-08 VITALS
DIASTOLIC BLOOD PRESSURE: 92 MMHG | BODY MASS INDEX: 23.1 KG/M2 | TEMPERATURE: 97.5 F | WEIGHT: 165 LBS | HEIGHT: 71 IN | SYSTOLIC BLOOD PRESSURE: 146 MMHG | RESPIRATION RATE: 20 BRPM | OXYGEN SATURATION: 98 % | HEART RATE: 68 BPM

## 2025-02-08 DIAGNOSIS — J18.9 COMMUNITY ACQUIRED PNEUMONIA OF LEFT LOWER LOBE OF LUNG: Primary | ICD-10-CM

## 2025-02-08 LAB
ANION GAP SERPL CALCULATED.3IONS-SCNC: 9 MMOL/L (ref 9–17)
BASOPHILS # BLD: 0 K/UL (ref 0–0.2)
BASOPHILS NFR BLD: 1 % (ref 0–2)
BNP SERPL-MCNC: 688 PG/ML
BUN SERPL-MCNC: 17 MG/DL (ref 8–23)
CALCIUM SERPL-MCNC: 8.8 MG/DL (ref 8.6–10.4)
CHLORIDE SERPL-SCNC: 106 MMOL/L (ref 98–107)
CO2 SERPL-SCNC: 27 MMOL/L (ref 20–31)
CREAT SERPL-MCNC: 0.9 MG/DL (ref 0.7–1.2)
EKG ATRIAL RATE: 276 BPM
EKG Q-T INTERVAL: 428 MS
EKG QRS DURATION: 96 MS
EKG QTC CALCULATION (BAZETT): 458 MS
EKG R AXIS: -35 DEGREES
EKG T AXIS: -39 DEGREES
EKG VENTRICULAR RATE: 69 BPM
EOSINOPHIL # BLD: 0.1 K/UL (ref 0–0.4)
EOSINOPHILS RELATIVE PERCENT: 1 % (ref 1–4)
ERYTHROCYTE [DISTWIDTH] IN BLOOD BY AUTOMATED COUNT: 15.3 % (ref 12.5–15.4)
FLUAV AG SPEC QL: NEGATIVE
FLUBV AG SPEC QL: NEGATIVE
GFR, ESTIMATED: 83 ML/MIN/1.73M2
GLUCOSE SERPL-MCNC: 98 MG/DL (ref 70–99)
HCT VFR BLD AUTO: 36.7 % (ref 41–53)
HGB BLD-MCNC: 12.1 G/DL (ref 13.5–17.5)
LYMPHOCYTES NFR BLD: 0.9 K/UL (ref 1–4.8)
LYMPHOCYTES RELATIVE PERCENT: 17 % (ref 24–44)
MCH RBC QN AUTO: 30.8 PG (ref 26–34)
MCHC RBC AUTO-ENTMCNC: 32.8 G/DL (ref 31–37)
MCV RBC AUTO: 93.9 FL (ref 80–100)
MONOCYTES NFR BLD: 0.4 K/UL (ref 0.1–1.2)
MONOCYTES NFR BLD: 9 % (ref 2–11)
NEUTROPHILS NFR BLD: 72 % (ref 36–66)
NEUTS SEG NFR BLD: 3.8 K/UL (ref 1.8–7.7)
PLATELET # BLD AUTO: 69 K/UL (ref 140–450)
PMV BLD AUTO: 9.2 FL (ref 6–12)
POTASSIUM SERPL-SCNC: 3.6 MMOL/L (ref 3.7–5.3)
RBC # BLD AUTO: 3.91 M/UL (ref 4.5–5.9)
SARS-COV-2 RDRP RESP QL NAA+PROBE: NOT DETECTED
SODIUM SERPL-SCNC: 142 MMOL/L (ref 135–144)
SPECIMEN DESCRIPTION: NORMAL
TROPONIN I SERPL HS-MCNC: 47 NG/L (ref 0–22)
TROPONIN I SERPL HS-MCNC: 48 NG/L (ref 0–22)
WBC OTHER # BLD: 5.1 K/UL (ref 3.5–11)

## 2025-02-08 PROCEDURE — 84484 ASSAY OF TROPONIN QUANT: CPT

## 2025-02-08 PROCEDURE — 99285 EMERGENCY DEPT VISIT HI MDM: CPT

## 2025-02-08 PROCEDURE — 6370000000 HC RX 637 (ALT 250 FOR IP): Performed by: STUDENT IN AN ORGANIZED HEALTH CARE EDUCATION/TRAINING PROGRAM

## 2025-02-08 PROCEDURE — 87804 INFLUENZA ASSAY W/OPTIC: CPT

## 2025-02-08 PROCEDURE — 83880 ASSAY OF NATRIURETIC PEPTIDE: CPT

## 2025-02-08 PROCEDURE — 93005 ELECTROCARDIOGRAM TRACING: CPT | Performed by: STUDENT IN AN ORGANIZED HEALTH CARE EDUCATION/TRAINING PROGRAM

## 2025-02-08 PROCEDURE — 87635 SARS-COV-2 COVID-19 AMP PRB: CPT

## 2025-02-08 PROCEDURE — 93010 ELECTROCARDIOGRAM REPORT: CPT | Performed by: INTERNAL MEDICINE

## 2025-02-08 PROCEDURE — 80048 BASIC METABOLIC PNL TOTAL CA: CPT

## 2025-02-08 PROCEDURE — 85025 COMPLETE CBC W/AUTO DIFF WBC: CPT

## 2025-02-08 PROCEDURE — 36415 COLL VENOUS BLD VENIPUNCTURE: CPT

## 2025-02-08 PROCEDURE — 71045 X-RAY EXAM CHEST 1 VIEW: CPT

## 2025-02-08 RX ORDER — AZITHROMYCIN 250 MG/1
250 TABLET, FILM COATED ORAL DAILY
Qty: 5 TABLET | Refills: 0 | Status: SHIPPED | OUTPATIENT
Start: 2025-02-08 | End: 2025-02-13

## 2025-02-08 RX ORDER — AZITHROMYCIN 250 MG/1
500 TABLET, FILM COATED ORAL ONCE
Status: COMPLETED | OUTPATIENT
Start: 2025-02-08 | End: 2025-02-08

## 2025-02-08 RX ORDER — ACETAMINOPHEN 325 MG/1
650 TABLET ORAL ONCE
Status: COMPLETED | OUTPATIENT
Start: 2025-02-08 | End: 2025-02-08

## 2025-02-08 RX ADMIN — AZITHROMYCIN DIHYDRATE 500 MG: 250 TABLET, FILM COATED ORAL at 02:36

## 2025-02-08 RX ADMIN — AMOXICILLIN AND CLAVULANATE POTASSIUM 1 TABLET: 875; 125 TABLET, FILM COATED ORAL at 02:36

## 2025-02-08 RX ADMIN — ACETAMINOPHEN 650 MG: 325 TABLET ORAL at 02:00

## 2025-02-08 ASSESSMENT — ENCOUNTER SYMPTOMS
FACIAL SWELLING: 0
WHEEZING: 0
SHORTNESS OF BREATH: 1
SORE THROAT: 0
BACK PAIN: 0
NAUSEA: 0
ABDOMINAL PAIN: 0
VOMITING: 0
COUGH: 1
BLOOD IN STOOL: 0
DIARRHEA: 0

## 2025-02-08 ASSESSMENT — LIFESTYLE VARIABLES
HOW OFTEN DO YOU HAVE A DRINK CONTAINING ALCOHOL: NEVER
HOW MANY STANDARD DRINKS CONTAINING ALCOHOL DO YOU HAVE ON A TYPICAL DAY: PATIENT DOES NOT DRINK

## 2025-02-08 ASSESSMENT — PAIN - FUNCTIONAL ASSESSMENT: PAIN_FUNCTIONAL_ASSESSMENT: NONE - DENIES PAIN

## 2025-02-08 NOTE — DISCHARGE INSTRUCTIONS
Please take antibiotics as prescribed all the way through until finished and follow-up with primary care provider.  If symptoms worsen or shortness of breath increases, please return to the emergency department.

## 2025-02-08 NOTE — ED PROVIDER NOTES
[facility] EMERGENCY DEPARTMENT  EMERGENCY DEPARTMENT ENCOUNTER      Pt Name: Panchito Sidhu  MRN: [id number]  Birthdate 1940  Date of evaluation: 2/8/2025  Provider: Young Woods DO    CHIEF COMPLAINT       Chief Complaint   Patient presents with    Shortness of Breath     Pt brought in by ems from [place]. Pt was complaining of SOB tonight and has a cough with some congestion. Pt denies any chest pain or nausea.          HISTORY OF PRESENT ILLNESS   (Location/Symptom, Timing/Onset, Context/Setting, Quality, Duration, Modifying Factors, Severity)  Note limiting factors.   Panchito Sidhu is a 85 y.o. male who presents to the emergency department with shortness of breath.  Patient has a history of Parkinson's disease and is presenting via EMS from [facility] with shortness of breath and productive cough for the past 2 days.  Denies fevers, chills, sweats, congestion, sore throat, chest pain, abdominal pain, nausea, vomiting, diarrhea, leg swelling.    HPI    Nursing Notes were reviewed.    REVIEW OF SYSTEMS    (2-9 systems for level 4, 10 or more for level 5)     Review of Systems   Constitutional:  Negative for chills and fever.   HENT:  Negative for congestion, facial swelling and sore throat.    Eyes:  Negative for visual disturbance.   Respiratory:  Positive for cough and shortness of breath. Negative for wheezing.    Cardiovascular:  Negative for chest pain, palpitations and leg swelling.   Gastrointestinal:  Negative for abdominal pain, blood in stool, diarrhea, nausea and vomiting.   Genitourinary:  Negative for dysuria and hematuria.   Musculoskeletal:  Negative for back pain and neck pain.   Skin:  Negative for rash.   Neurological:  Negative for syncope, weakness, numbness and headaches.   Hematological:  Does not bruise/bleed easily.       Except as noted above the remainder of the review of systems was reviewed and negative.       PAST MEDICAL HISTORY     Past Medical

## 2025-02-10 LAB
EKG ATRIAL RATE: 276 BPM
EKG Q-T INTERVAL: 428 MS
EKG QRS DURATION: 96 MS
EKG QTC CALCULATION (BAZETT): 458 MS
EKG R AXIS: -35 DEGREES
EKG T AXIS: -39 DEGREES
EKG VENTRICULAR RATE: 69 BPM

## 2025-04-27 ENCOUNTER — APPOINTMENT (OUTPATIENT)
Dept: CT IMAGING | Age: 85
End: 2025-04-27
Payer: MEDICARE

## 2025-04-27 ENCOUNTER — HOSPITAL ENCOUNTER (EMERGENCY)
Age: 85
Discharge: HOME | End: 2025-04-27
Attending: EMERGENCY MEDICINE
Payer: MEDICARE

## 2025-04-27 ENCOUNTER — APPOINTMENT (OUTPATIENT)
Dept: GENERAL RADIOLOGY | Age: 85
End: 2025-04-27
Payer: MEDICARE

## 2025-04-27 VITALS
OXYGEN SATURATION: 95 % | HEIGHT: 70 IN | TEMPERATURE: 97.7 F | HEART RATE: 73 BPM | RESPIRATION RATE: 17 BRPM | WEIGHT: 165 LBS | SYSTOLIC BLOOD PRESSURE: 128 MMHG | DIASTOLIC BLOOD PRESSURE: 77 MMHG | BODY MASS INDEX: 23.62 KG/M2

## 2025-04-27 DIAGNOSIS — S70.01XA CONTUSION OF RIGHT HIP, INITIAL ENCOUNTER: ICD-10-CM
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
## 2025-04-27 DIAGNOSIS — S40.011A CONTUSION OF RIGHT SHOULDER, INITIAL ENCOUNTER: Primary | ICD-10-CM

## 2025-04-27 DIAGNOSIS — S09.90XA CLOSED HEAD INJURY, INITIAL ENCOUNTER: ICD-10-CM

## 2025-04-27 PROCEDURE — 70450 CT HEAD/BRAIN W/O DYE: CPT

## 2025-04-27 PROCEDURE — 73030 X-RAY EXAM OF SHOULDER: CPT

## 2025-04-27 PROCEDURE — 99284 EMERGENCY DEPT VISIT MOD MDM: CPT

## 2025-04-27 PROCEDURE — 73502 X-RAY EXAM HIP UNI 2-3 VIEWS: CPT

## 2025-04-27 ASSESSMENT — LIFESTYLE VARIABLES: HOW MANY STANDARD DRINKS CONTAINING ALCOHOL DO YOU HAVE ON A TYPICAL DAY: PATIENT DOES NOT DRINK

## 2025-04-27 ASSESSMENT — PAIN DESCRIPTION - LOCATION: LOCATION: SHOULDER

## 2025-04-27 ASSESSMENT — PAIN - FUNCTIONAL ASSESSMENT
PAIN_FUNCTIONAL_ASSESSMENT: ACTIVITIES ARE NOT PREVENTED
PAIN_FUNCTIONAL_ASSESSMENT: 0-10

## 2025-04-27 ASSESSMENT — PAIN DESCRIPTION - DESCRIPTORS: DESCRIPTORS: ACHING;DISCOMFORT

## 2025-04-27 ASSESSMENT — PAIN DESCRIPTION - ORIENTATION: ORIENTATION: RIGHT

## 2025-04-27 NOTE — ED NOTES
Facility called to clarify when patient last got his parkinsons medication. He is up to date on his 1400 dose per [facility].

## 2025-04-27 NOTE — PROGRESS NOTES
Spiritual Health History and Assessment/Progress Note  [facility]    (P) Spiritual/Emotional Needs, Initial Encounter,  , (P) Life Adjustments, Adjustment to illness,      Name: Panchito Sidhu MRN: [id number]    Age: 85 y.o.     Sex: male   Language: English   Adventism: Presbyterian   <principal problem not specified>     Date: 4/27/2025            Total Time Calculated: (P) 15 min              Spiritual Assessment began in [facility] EMERGENCY DEPARTMENT        Referral/Consult From: (P) Rounding   Encounter Overview/Reason: (P) Spiritual/Emotional Needs, Initial Encounter  Service Provided For: (P) Patient, Family      Pt. Was welcoming and open to conversation as  visited in the ER.  provided support and pastoral care to pt. And family. Active listening and encouragement  were offered and prayer as requested for comfort, strength and peace of mind.    Dee, Belief, Meaning:   Patient has beliefs or practices that help with coping during difficult times  Family/Friends have beliefs or practices that help with coping during difficult times      Importance and Influence:  Patient has spiritual/personal beliefs that influence decisions regarding their health  Family/Friends have spiritual/personal beliefs that influence decisions regarding the patient's health    Community:  Patient feels well-supported. Support system includes: Spouse/Partner and Children  Family/Friends feel well-supported. Support system includes: Parent/s and Extended family    Assessment and Plan of Care:     Patient Interventions include: Facilitated expression of thoughts and feelings and Explored spiritual coping/struggle/distress  Family/Friends Interventions include: Facilitated expression of thoughts and feelings and Explored spiritual coping/struggle/distress    Patient Plan of Care: Spiritual Care available upon further referral  Family/Friends Plan of Care: Spiritual Care available upon further

## 2025-04-27 NOTE — ED PROVIDER NOTES
[facility] EMERGENCY DEPARTMENT  eMERGENCY dEPARTMENT eNCOUnter   Attending Physician Attestation    Pt Name: Panchito Sidhu  MRN: [id number]  Birthdate 1940  Date of evaluation: 4/27/25        I personally made/approves the management plan for this patient's and take responsibility for the patient management.      (Please note that portions of this note were completed with a voice recognition program.  Efforts were made to edit the dictations but occasionally words are mis-transcribed.)    Jose Shepherd DO  Attending Emergency  Physician                Jose Shepherd DO  04/27/25 1533

## 2025-04-27 NOTE — DISCHARGE INSTRUCTIONS
Tylenol over-the-counter as directed to help with pain.    Salonpas patches over-the-counter as directed to help with pain.    Return to the ER: Atypical headaches, mentation or behavior changes, chest pain or breathing difficulty, redness warmth swelling to the right shoulder or the right hip, weakness; or any other concerning symptoms.

## 2025-04-27 NOTE — ED PROVIDER NOTES
[facility] EMERGENCY DEPARTMENT  EMERGENCY DEPARTMENT ENCOUNTER      Pt Name: Panchito Sidhu  MRN: [id number]  Birthdate 1940  Date of evaluation: 4/27/2025  Provider: LESTER Nash CNP  5:59 PM    CHIEF COMPLAINT       Chief Complaint   Patient presents with    Fall     Fell at 0530 this am when getting out of bed. Denies hitting head, denies dizziness or LOC. C/o R shoulder pain          HISTORY OF PRESENT ILLNESS    Panchito Sidhu is a 85 y.o. male who presents to the emergency department      This is a nontoxic chronically ill-appearing 85-year-old male presenting to the emergency department via EMS from [facility] where the patient resides, his daughter is at the bedside, around 5 AM this morning the patient had a mechanical fall getting out of the bed landing on the right side, he is not anticoagulated he had no loss of consciousness, he does have a history of Parkinson's, dementia; he has had previous traumatic brain bleeds, he has been complaining of right hip pain right shoulder pain prompting the referral to the emergency department, the patient was given acetaminophen prior to arrival to help with pain symptoms which mildly helped.    The history is provided by the patient and medical records.       Nursing Notes were reviewed.    REVIEW OF SYSTEMS       Review of Systems   Constitutional:  Negative for chills and fever.        Fall from standing.   HENT:  Negative for congestion, ear pain and sneezing.    Eyes:  Negative for discharge and visual disturbance.   Respiratory:  Negative for cough and shortness of breath.    Cardiovascular:  Negative for chest pain and palpitations.   Gastrointestinal:  Negative for abdominal pain, constipation, diarrhea, nausea and vomiting.   Genitourinary:  Negative for dysuria and frequency.   Musculoskeletal:  Negative for back pain and neck pain.        Positive for right hip pain positive for right shoulder pain   Skin:  Negative for

## 2025-05-02 ENCOUNTER — HOSPITAL ENCOUNTER (OUTPATIENT)
Age: 85
Setting detail: SPECIMEN
Discharge: HOME | End: 2025-05-02
Payer: MEDICARE

## 2025-05-02 LAB
ANION GAP SERPL CALCULATED.3IONS-SCNC: 12 MMOL/L (ref 9–16)
BASOPHILS # BLD: <0.03 K/UL (ref 0–0.2)
BASOPHILS NFR BLD: 0 % (ref 0–2)
BUN SERPL-MCNC: 17 MG/DL (ref 8–23)
CALCIUM SERPL-MCNC: 8.9 MG/DL (ref 8.8–10.2)
CHLORIDE SERPL-SCNC: 103 MMOL/L (ref 98–107)
CO2 SERPL-SCNC: 25 MMOL/L (ref 20–31)
CREAT SERPL-MCNC: 0.9 MG/DL (ref 0.7–1.2)
EOSINOPHIL # BLD: 0.1 K/UL (ref 0–0.44)
EOSINOPHILS RELATIVE PERCENT: 2 % (ref 1–4)
ERYTHROCYTE [DISTWIDTH] IN BLOOD BY AUTOMATED COUNT: 15.9 % (ref 11.8–14.4)
FERRITIN SERPL-MCNC: 159 NG/ML
GFR, ESTIMATED: 84 ML/MIN/1.73M2
GLUCOSE SERPL-MCNC: 100 MG/DL (ref 82–115)
HCT VFR BLD AUTO: 40.4 % (ref 40.7–50.3)
HGB BLD-MCNC: 13 G/DL (ref 13–17)
IMM GRANULOCYTES # BLD AUTO: 0.04 K/UL (ref 0–0.3)
IMM GRANULOCYTES NFR BLD: 1 %
IRON SATN MFR SERPL: 22 % (ref 20–55)
IRON SERPL-MCNC: 64 UG/DL (ref 61–157)
LYMPHOCYTES NFR BLD: 0.83 K/UL (ref 1.1–3.7)
LYMPHOCYTES RELATIVE PERCENT: 14 % (ref 24–43)
MCH RBC QN AUTO: 31.3 PG (ref 25.2–33.5)
MCHC RBC AUTO-ENTMCNC: 32.2 G/DL (ref 28.4–34.8)
MCV RBC AUTO: 97.3 FL (ref 82.6–102.9)
MONOCYTES NFR BLD: 0.53 K/UL (ref 0.1–1.2)
MONOCYTES NFR BLD: 9 % (ref 3–12)
NEUTROPHILS NFR BLD: 74 % (ref 36–65)
NEUTS SEG NFR BLD: 4.53 K/UL (ref 1.5–8.1)
NRBC BLD-RTO: 0 PER 100 WBC
PLATELET # BLD AUTO: 76 K/UL (ref 138–453)
PMV BLD AUTO: 12.4 FL (ref 8.1–13.5)
POTASSIUM SERPL-SCNC: 4 MMOL/L (ref 3.7–5.3)
RBC # BLD AUTO: 4.15 M/UL (ref 4.21–5.77)
RBC # BLD: ABNORMAL 10*6/UL
SODIUM SERPL-SCNC: 140 MMOL/L (ref 136–145)
TIBC SERPL-MCNC: 295 UG/DL (ref 250–450)
UNSATURATED IRON BINDING CAPACITY: 231 UG/DL (ref 112–347)
WBC OTHER # BLD: 6.1 K/UL (ref 3.5–11.3)

## 2025-05-02 PROCEDURE — 83540 ASSAY OF IRON: CPT

## 2025-05-02 PROCEDURE — 85025 COMPLETE CBC W/AUTO DIFF WBC: CPT

## 2025-05-02 PROCEDURE — 36415 COLL VENOUS BLD VENIPUNCTURE: CPT

## 2025-05-02 PROCEDURE — 82728 ASSAY OF FERRITIN: CPT

## 2025-05-02 PROCEDURE — 83550 IRON BINDING TEST: CPT

## 2025-05-02 PROCEDURE — 80048 BASIC METABOLIC PNL TOTAL CA: CPT

## 2025-05-07 ENCOUNTER — HOSPITAL ENCOUNTER (OUTPATIENT)
Age: 85
Setting detail: SPECIMEN
Discharge: HOME | End: 2025-05-07
Payer: MEDICARE

## 2025-05-07 PROCEDURE — 81001 URINALYSIS AUTO W/SCOPE: CPT

## 2025-05-07 PROCEDURE — 87086 URINE CULTURE/COLONY COUNT: CPT

## 2025-05-08 LAB
AMORPH SED URNS QL MICRO: ABNORMAL
BILIRUB UR QL STRIP: ABNORMAL
CASTS #/AREA URNS LPF: ABNORMAL /LPF (ref 0–2)
CASTS #/AREA URNS LPF: ABNORMAL /LPF (ref 0–2)
CLARITY UR: ABNORMAL
COLOR UR: ABNORMAL
CRYSTALS URNS MICRO: ABNORMAL /HPF
CRYSTALS URNS MICRO: ABNORMAL /HPF
EPI CELLS #/AREA URNS HPF: ABNORMAL /HPF (ref 0–5)
GLUCOSE UR STRIP-MCNC: NEGATIVE MG/DL
HGB UR QL STRIP.AUTO: ABNORMAL
KETONES UR STRIP-MCNC: ABNORMAL MG/DL
LEUKOCYTE ESTERASE UR QL STRIP: ABNORMAL
NITRITE UR QL STRIP: NEGATIVE
PH UR STRIP: 5 [PH] (ref 5–8)
PROT UR STRIP-MCNC: ABNORMAL MG/DL
RBC #/AREA URNS HPF: ABNORMAL /HPF (ref 0–2)
SP GR UR STRIP: 1.02 (ref 1–1.03)
UROBILINOGEN UR STRIP-ACNC: NORMAL EU/DL (ref 0–1)
WBC #/AREA URNS HPF: ABNORMAL /HPF (ref 0–5)

## 2025-05-09 LAB
MICROORGANISM SPEC CULT: NORMAL
SPECIMEN DESCRIPTION: NORMAL

## 2025-07-14 ENCOUNTER — HOSPITAL ENCOUNTER (EMERGENCY)
Age: 85
Discharge: HOME | End: 2025-07-14
Attending: EMERGENCY MEDICINE
Payer: MEDICARE

## 2025-07-14 ENCOUNTER — APPOINTMENT (OUTPATIENT)
Dept: GENERAL RADIOLOGY | Age: 85
End: 2025-07-14
Payer: MEDICARE

## 2025-07-14 ENCOUNTER — APPOINTMENT (OUTPATIENT)
Dept: CT IMAGING | Age: 85
End: 2025-07-14
Payer: MEDICARE

## 2025-07-14 VITALS
TEMPERATURE: 97.8 F | OXYGEN SATURATION: 99 % | SYSTOLIC BLOOD PRESSURE: 172 MMHG | HEART RATE: 78 BPM | RESPIRATION RATE: 18 BRPM | DIASTOLIC BLOOD PRESSURE: 108 MMHG
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
## 2025-07-14 PROCEDURE — 12002 RPR S/N/AX/GEN/TRNK2.6-7.5CM: CPT

## 2025-07-14 PROCEDURE — 73590 X-RAY EXAM OF LOWER LEG: CPT

## 2025-07-14 PROCEDURE — 70450 CT HEAD/BRAIN W/O DYE: CPT

## 2025-07-14 PROCEDURE — 99284 EMERGENCY DEPT VISIT MOD MDM: CPT

## 2025-07-14 PROCEDURE — 72125 CT NECK SPINE W/O DYE: CPT

## 2025-07-14 PROCEDURE — 73080 X-RAY EXAM OF ELBOW: CPT

## 2025-07-14 RX ORDER — LIDOCAINE HYDROCHLORIDE AND EPINEPHRINE 10; 10 MG/ML; UG/ML
20 INJECTION, SOLUTION INFILTRATION; PERINEURAL ONCE
Status: DISCONTINUED | OUTPATIENT
Start: 2025-07-14 | End: 2025-07-14 | Stop reason: HOSPADM

## 2025-07-14 ASSESSMENT — PAIN - FUNCTIONAL ASSESSMENT: PAIN_FUNCTIONAL_ASSESSMENT: NONE - DENIES PAIN

## 2025-07-14 ASSESSMENT — ENCOUNTER SYMPTOMS
SORE THROAT: 0
ABDOMINAL PAIN: 0
SHORTNESS OF BREATH: 0
VOMITING: 0
DIARRHEA: 0
NAUSEA: 0

## 2025-07-14 NOTE — ED NOTES
Spoke with Jennifer echevarria at [facility] report on pt condition given.  Pt coming back to them via [facility] in wheelchair

## 2025-07-14 NOTE — DISCHARGE INSTRUCTIONS
Follow-up with your doctor in the next 7-10 days for a recheck and suture removal.    Return right away for worsening symptoms, fevers, redness that is spreading around the wound, excess swelling around the wound, pus or discharge coming from the wound, any other new or concerning symptoms.      PLEASE RETURN TO THE EMERGENCY DEPARTMENT IMMEDIATELY if your symptoms worsen in anyway.  You should immediately return to the ER for symptoms such as increasing pain, fever, drainage from the wound, warmth or redness around the cut, increasing swelling, numbness or weakness to the extremity, color change or coldness to the extremity    Take your medication as indicated and prescribed.  If you are given an antibiotic then, make sure you get the prescription filled and take the antibiotics until finished.  It is advised that you keep your wound clean and dry.  You may apply antibiotic ointment to the area.       Please understand that at this time there is no evidence for a more serious underlying process, but that early in the process of an illness or injury, an emergency department workup can be falsely reassuring.  You should contact your family doctor within the next 48 hours for a follow up appointment.      THANK YOU!!!    From [facility] and [facility]    On behalf of the Emergency Department staff at [facility], I would like to thank you for giving us the opportunity to address your health care needs and concerns.    We hope that during your visit, our service was delivered in a professional and caring manner. Please keep [facility] in mind as we walk with you down the path to your own personal wellness.     Please expect an automated text message or email from us so we can ask a few questions about your health and progress. Based on your answers, a clinician may call you back to offer help and instructions.    Please understand that early in the process of an illness or injury, an emergency

## 2025-07-14 NOTE — ED PROVIDER NOTES
I was asked to complete the laceration repair.  I had no other involvement in the care of the patient.  Please see the attending physician's note for full documentation          Lac Repair    Date/Time: 7/14/2025 9:07 AM    Performed by: Zoila Fofana APRN - CNP  Authorized by: Lalo Sanchez DO    Consent:     Consent obtained:  Verbal    Consent given by:  Patient    Risks discussed:  Infection  Universal protocol:     Procedure explained and questions answered to patient or proxy's satisfaction: yes      Patient identity confirmed:  Verbally with patient  Anesthesia:     Anesthesia method:  Local infiltration    Local anesthetic:  Lidocaine 1% WITH epi  Laceration details:     Location:  Leg    Leg location:  L lower leg    Length (cm):  6  Pre-procedure details:     Preparation:  Patient was prepped and draped in usual sterile fashion  Exploration:     Limited defect created (wound extended): no      Hemostasis achieved with:  Epinephrine and direct pressure    Wound extent: no nerve damage noted and no tendon damage noted      Contaminated: no    Treatment:     Area cleansed with:  Chlorhexidine and saline    Amount of cleaning:  Extensive    Irrigation solution:  Sterile saline    Irrigation volume:  250ml    Irrigation method:  Syringe  Skin repair:     Repair method:  Sutures    Suture size:  4-0    Suture material:  Nylon    Suture technique:  Simple interrupted    Number of sutures:  10  Approximation:     Approximation:  Loose  Repair type:     Repair type:  Simple  Post-procedure details:     Dressing:  Non-adherent dressing and bulky dressing    Procedure completion:  Tolerated well, no immediate complications             Zoila Fofana APRN - CNP  07/14/25 0909

## 2025-07-14 NOTE — ED PROVIDER NOTES
[facility] Emergency Department  [address]  [place] [address]  Phone: [phone]  Fax: [phone]      Patient Name:  Panchito Sidhu  Medical Record Number:  [id number]  YOB: 1940  Date of Service:  7/14/2025  Primary Care Physician:  Phan Zelaya MD      CHIEF COMPLAINT:       Chief Complaint   Patient presents with    Fall     [place] called out ems for fall out of bed that occurred at 430 am, laceration to right elbow and left ankle        HISTORY OF PRESENT ILLNESS:    Panchito Sidhu is a 85 y.o. male who presents with primarily pain to his left distal leg.  Lives at a long-term care facility on a memory care unit and was found on the floor.  Was found to have the laceration to the leg.  Was found around 430 this morning.  Unclear why they waited a few hours to call EMS.  Patient reports no pain anywhere and is fairly coherent and remembers the fall.  He reports he fell because the lifted his bed reportedly.  Denies hitting his head.  Denies any other injuries or concerns other than a skin tear to his right elbow region but he reports that does not hurt.  No other symptoms or concerns or injuries reported.  No chest pain or difficulty breathing    REVIEW OF SYSTEMS:     Review of Systems   Constitutional:  Negative for chills and fever.   HENT:  Negative for sore throat.    Respiratory:  Negative for shortness of breath.    Cardiovascular:  Negative for chest pain.   Gastrointestinal:  Negative for abdominal pain, diarrhea, nausea and vomiting.   Musculoskeletal:  Negative for neck pain.   Skin:  Negative for rash.   Neurological:  Negative for weakness and numbness.         CURRENT MEDICATIONS:      Current Discharge Medication List        CONTINUE these medications which have NOT CHANGED    Details   GNP VITAMIN D SUPER STRENGTH 125 MCG (5000 UT) TABS tablet       co-enzyme Q-10 50 MG CAPS capsule       denosumab (PROLIA) 60 MG/ML SOSY SC injection Inject 1 mL

## 2025-07-19 ENCOUNTER — HOSPITAL ENCOUNTER (EMERGENCY)
Age: 85
Discharge: NURSING, REHAB OR LONG-TERM CARE | End: 2025-07-20
Attending: EMERGENCY MEDICINE
Payer: MEDICARE

## 2025-07-19 ENCOUNTER — APPOINTMENT (OUTPATIENT)
Dept: GENERAL RADIOLOGY | Age: 85
End: 2025-07-19
Payer: MEDICARE

## 2025-07-19 DIAGNOSIS — L03.116 CELLULITIS OF LEFT LOWER EXTREMITY: Primary | ICD-10-CM

## 2025-07-19 LAB
ANION GAP SERPL CALCULATED.3IONS-SCNC: 9 MMOL/L (ref 9–16)
BASOPHILS # BLD: 0.1 K/UL (ref 0–0.2)
BASOPHILS NFR BLD: 1 % (ref 0–2)
BUN SERPL-MCNC: 18 MG/DL (ref 8–23)
CALCIUM SERPL-MCNC: 8.5 MG/DL (ref 8.6–10.4)
CHLORIDE SERPL-SCNC: 104 MMOL/L (ref 98–107)
CO2 SERPL-SCNC: 26 MMOL/L (ref 20–31)
CREAT SERPL-MCNC: 0.9 MG/DL (ref 0.7–1.2)
EOSINOPHIL # BLD: 0.1 K/UL (ref 0–0.4)
EOSINOPHILS RELATIVE PERCENT: 1 % (ref 1–4)
ERYTHROCYTE [DISTWIDTH] IN BLOOD BY AUTOMATED COUNT: 18 % (ref 12.5–15.4)
GFR, ESTIMATED: 83 ML/MIN/1.73M2
GLUCOSE SERPL-MCNC: 100 MG/DL (ref 74–99)
HCT VFR BLD AUTO: 38 % (ref 41–53)
HGB BLD-MCNC: 12.1 G/DL (ref 13.5–17.5)
LYMPHOCYTES NFR BLD: 0.7 K/UL (ref 1–4.8)
LYMPHOCYTES RELATIVE PERCENT: 11 % (ref 24–44)
MCH RBC QN AUTO: 29.8 PG (ref 26–34)
MCHC RBC AUTO-ENTMCNC: 31.8 G/DL (ref 31–37)
MCV RBC AUTO: 93.7 FL (ref 80–100)
MONOCYTES NFR BLD: 0.7 K/UL (ref 0.1–1.2)
MONOCYTES NFR BLD: 10 % (ref 2–11)
NEUTROPHILS NFR BLD: 77 % (ref 36–66)
NEUTS SEG NFR BLD: 5.1 K/UL (ref 1.8–7.7)
PLATELET # BLD AUTO: 83 K/UL (ref 140–450)
PMV BLD AUTO: 9.4 FL (ref 6–12)
POTASSIUM SERPL-SCNC: 4 MMOL/L (ref 3.7–5.3)
PROCALCITONIN SERPL-MCNC: 0.06 NG/ML (ref 0–0.09)
RBC # BLD AUTO: 4.06 M/UL (ref 4.5–5.9)
SODIUM SERPL-SCNC: 139 MMOL/L (ref 136–145)
WBC OTHER # BLD: 6.7 K/UL (ref 3.5–11)

## 2025-07-19 PROCEDURE — 84145 PROCALCITONIN (PCT): CPT

## 2025-07-19 PROCEDURE — 80048 BASIC METABOLIC PNL TOTAL CA: CPT

## 2025-07-19 PROCEDURE — 6370000000 HC RX 637 (ALT 250 FOR IP): Performed by: EMERGENCY MEDICINE

## 2025-07-19 PROCEDURE — 6360000002 HC RX W HCPCS: Performed by: EMERGENCY MEDICINE

## 2025-07-19 PROCEDURE — 36415 COLL VENOUS BLD VENIPUNCTURE: CPT

## 2025-07-19 PROCEDURE — 85025 COMPLETE CBC W/AUTO DIFF WBC: CPT

## 2025-07-19 PROCEDURE — 73590 X-RAY EXAM OF LOWER LEG: CPT

## 2025-07-19 PROCEDURE — 2500000003 HC RX 250 WO HCPCS: Performed by: EMERGENCY MEDICINE

## 2025-07-19 PROCEDURE — 99284 EMERGENCY DEPT VISIT MOD MDM: CPT

## 2025-07-19 PROCEDURE — 96374 THER/PROPH/DIAG INJ IV PUSH: CPT

## 2025-07-19 RX ORDER — DOXYCYCLINE HYCLATE 100 MG
100 TABLET ORAL 2 TIMES DAILY
Qty: 14 TABLET | Refills: 0 | Status: SHIPPED | OUTPATIENT
Start: 2025-07-19 | End: 2025-07-20

## 2025-07-19 RX ORDER — CEPHALEXIN 500 MG/1
500 CAPSULE ORAL 4 TIMES DAILY
Qty: 28 CAPSULE | Refills: 0 | Status: SHIPPED | OUTPATIENT
Start: 2025-07-19 | End: 2025-07-20

## 2025-07-19 RX ORDER — DOXYCYCLINE HYCLATE 100 MG
100 TABLET ORAL ONCE
Status: COMPLETED | OUTPATIENT
Start: 2025-07-19 | End: 2025-07-19

## 2025-07-19 RX ADMIN — WATER 1000 MG: 1 INJECTION INTRAMUSCULAR; INTRAVENOUS; SUBCUTANEOUS at 21:17

## 2025-07-19 RX ADMIN — DOXYCYCLINE HYCLATE 100 MG: 100 TABLET, COATED ORAL at 22:55

## 2025-07-19 ASSESSMENT — PAIN DESCRIPTION - LOCATION: LOCATION: LEG

## 2025-07-19 ASSESSMENT — PAIN SCALES - GENERAL: PAINLEVEL_OUTOF10: 4

## 2025-07-19 ASSESSMENT — PAIN - FUNCTIONAL ASSESSMENT: PAIN_FUNCTIONAL_ASSESSMENT: 0-10

## 2025-07-19 ASSESSMENT — PAIN DESCRIPTION - DESCRIPTORS: DESCRIPTORS: THROBBING

## 2025-07-19 ASSESSMENT — PAIN DESCRIPTION - ORIENTATION: ORIENTATION: LEFT;LOWER

## 2025-07-20 VITALS
SYSTOLIC BLOOD PRESSURE: 171 MMHG | HEIGHT: 70 IN | WEIGHT: 170 LBS | DIASTOLIC BLOOD PRESSURE: 117 MMHG | HEART RATE: 69 BPM | OXYGEN SATURATION: 98 % | BODY MASS INDEX: 24.34 KG/M2 | TEMPERATURE: 97.2 F | RESPIRATION RATE: 18 BRPM

## 2025-07-20 RX ORDER — DOXYCYCLINE HYCLATE 100 MG
100 TABLET ORAL 2 TIMES DAILY
Qty: 14 TABLET | Refills: 0 | Status: SHIPPED | OUTPATIENT
Start: 2025-07-20 | End: 2025-07-27

## 2025-07-20 RX ORDER — CEPHALEXIN 500 MG/1
500 CAPSULE ORAL 4 TIMES DAILY
Qty: 28 CAPSULE | Refills: 0 | Status: SHIPPED | OUTPATIENT
Start: 2025-07-20 | End: 2025-07-27

## 2025-07-20 NOTE — ED PROVIDER NOTES
[facility] Emergency Department  [address]  [place] [address]  Phone: [phone]  Fax: [phone]      Patient Name:  Panchito Sidhu  Medical Record Number:  [id number]  YOB: 1940  Date of Service:  7/19/2025  Primary Care Physician:  Phan Zelaya MD      CHIEF COMPLAINT:       Chief Complaint   Patient presents with    Wound Check     BIB EMS from [place], facility reports bleeding from leg wound pt incurred last Monday (fall); sutured wound to left lower leg, no active bleeding at this time       HISTORY OF PRESENT ILLNESS:    Panchito Sidhu is a 85 y.o. male who presents with the complaint of redness and pain to his left lower leg where he had a laceration 4 days ago from an injury at his nursing home.  Laceration was closed in the emergency department.  Area is currently swollen with devitalized tissue   CURRENT MEDICATIONS:      Previous Medications    ACETAMINOPHEN (TYLENOL) 325 MG TABLET    Take 2 tablets by mouth every 6 hours as needed for Pain    ATORVASTATIN (LIPITOR) 80 MG TABLET    Take by mouth daily    BISACODYL (DULCOLAX) 5 MG EC TABLET    Take 1 tablet by mouth 2 times daily as needed for Constipation Every 12 hours as needed    CARBIDOPA-LEVODOPA (PARCOPA)  MG TBDP    Take 2 tablets by mouth 3 times daily    CHOLECALCIFEROL (VITAMIN D3) 1.25 MG (67228 UT) CAPS    Take by mouth    CO-ENZYME Q-10 50 MG CAPS CAPSULE        DENOSUMAB (PROLIA) 60 MG/ML SOSY SC INJECTION    Inject 1 mL into the skin once    FUROSEMIDE (LASIX) 20 MG TABLET    Take by mouth daily    GNP VITAMIN D SUPER STRENGTH 125 MCG (5000 UT) TABS TABLET        LIDOCAINE 4 % GEL    Apply topically 2 times daily as needed    LISINOPRIL (PRINIVIL;ZESTRIL) 20 MG TABLET    Take by mouth daily    MELATONIN 5 MG TBDP DISINTEGRATING TABLET        OXYBUTYNIN (DITROPAN XL) 5 MG EXTENDED RELEASE TABLET    Take 1 tablet by mouth daily    POTASSIUM CHLORIDE (MICRO-K) 10 MEQ EXTENDED RELEASE

## 2025-08-25 ENCOUNTER — HOSPITAL ENCOUNTER (OUTPATIENT)
Age: 85
Setting detail: SPECIMEN
Discharge: HOME | End: 2025-08-25
Payer: MEDICARE

## 2025-08-25 PROCEDURE — 87086 URINE CULTURE/COLONY COUNT: CPT

## 2025-08-25 PROCEDURE — 81001 URINALYSIS AUTO W/SCOPE: CPT

## 2025-08-26 LAB
BILIRUB UR QL STRIP: NEGATIVE
CLARITY UR: CLEAR
COLOR UR: YELLOW
CRYSTALS URNS MICRO: ABNORMAL /HPF
EPI CELLS #/AREA URNS HPF: ABNORMAL /HPF (ref 0–5)
GLUCOSE UR STRIP-MCNC: NEGATIVE MG/DL
HGB UR QL STRIP.AUTO: ABNORMAL
KETONES UR STRIP-MCNC: ABNORMAL MG/DL
LEUKOCYTE ESTERASE UR QL STRIP: NEGATIVE
MUCOUS THREADS URNS QL MICRO: ABNORMAL
NITRITE UR QL STRIP: NEGATIVE
PH UR STRIP: 5 [PH] (ref 5–8)
PROT UR STRIP-MCNC: NEGATIVE MG/DL
RBC #/AREA URNS HPF: ABNORMAL /HPF (ref 0–2)
SP GR UR STRIP: 1.02 (ref 1–1.03)
UROBILINOGEN UR STRIP-ACNC: NORMAL EU/DL (ref 0–1)
WBC #/AREA URNS HPF: ABNORMAL /HPF (ref 0–5)

## 2025-08-27 LAB
MICROORGANISM SPEC CULT: NORMAL
SERVICE CMNT-IMP: NORMAL
SPECIMEN DESCRIPTION: NORMAL

## 2025-08-30 ENCOUNTER — HOSPITAL ENCOUNTER (EMERGENCY)
Age: 85
Discharge: HOME | End: 2025-08-30
Attending: EMERGENCY MEDICINE
Payer: MEDICARE

## 2025-08-30 ENCOUNTER — APPOINTMENT (OUTPATIENT)
Dept: CT IMAGING | Age: 85
End: 2025-08-30
Payer: MEDICARE

## 2025-08-30 VITALS
HEIGHT: 66 IN | DIASTOLIC BLOOD PRESSURE: 87 MMHG | BODY MASS INDEX: 26.57 KG/M2 | TEMPERATURE: 98.2 F | RESPIRATION RATE: 18 BRPM | OXYGEN SATURATION: 97 % | HEART RATE: 70 BPM | SYSTOLIC BLOOD PRESSURE: 140 MMHG | WEIGHT: 165.34 LBS
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
## 2025-08-30 DIAGNOSIS — S09.90XA INJURY OF HEAD, INITIAL ENCOUNTER: ICD-10-CM

## 2025-08-30 PROCEDURE — 70450 CT HEAD/BRAIN W/O DYE: CPT

## 2025-08-30 PROCEDURE — 99284 EMERGENCY DEPT VISIT MOD MDM: CPT

## 2025-08-30 PROCEDURE — 72125 CT NECK SPINE W/O DYE: CPT

## 2025-08-30 ASSESSMENT — ENCOUNTER SYMPTOMS
SHORTNESS OF BREATH: 0
DIARRHEA: 0
VOMITING: 0
ABDOMINAL PAIN: 0
SORE THROAT: 0
NAUSEA: 0

## 2025-08-30 ASSESSMENT — PAIN - FUNCTIONAL ASSESSMENT
PAIN_FUNCTIONAL_ASSESSMENT: 0-10
PAIN_FUNCTIONAL_ASSESSMENT: 0-10

## 2025-08-30 ASSESSMENT — PAIN SCALES - GENERAL
PAINLEVEL_OUTOF10: 0
PAINLEVEL_OUTOF10: 0

## (undated) DEVICE — GLOVE ORANGE PI 7 1/2   MSG9075

## (undated) DEVICE — MHPB CYSTO PACK-LF: Brand: MEDLINE INDUSTRIES, INC.

## (undated) DEVICE — TOWEL,OR,DSP,ST,NATURAL,DLX,4/PK,20PK/CS: Brand: MEDLINE

## (undated) DEVICE — SOLUTION IV 1000ML 0.9% SOD CHL PH 5 INJ USP VIAFLX PLAS

## (undated) DEVICE — SOLUTION SCRB 4OZ 4% CHG H2O AIDED FOR PREOPERATIVE SKIN